# Patient Record
Sex: MALE | Race: OTHER | Employment: OTHER | URBAN - METROPOLITAN AREA
[De-identification: names, ages, dates, MRNs, and addresses within clinical notes are randomized per-mention and may not be internally consistent; named-entity substitution may affect disease eponyms.]

---

## 2020-02-02 ENCOUNTER — HOSPITAL ENCOUNTER (OUTPATIENT)
Age: 45
Discharge: ANOTHER HOSPITAL | End: 2020-02-02
Attending: EMERGENCY MEDICINE
Payer: COMMERCIAL

## 2020-02-02 VITALS
TEMPERATURE: 100 F | BODY MASS INDEX: 27.46 KG/M2 | RESPIRATION RATE: 18 BRPM | SYSTOLIC BLOOD PRESSURE: 150 MMHG | OXYGEN SATURATION: 100 % | HEART RATE: 97 BPM | HEIGHT: 63 IN | DIASTOLIC BLOOD PRESSURE: 105 MMHG | WEIGHT: 155 LBS

## 2020-02-02 DIAGNOSIS — N50.819 TESTICLE PAIN: Primary | ICD-10-CM

## 2020-02-02 PROCEDURE — 99202 OFFICE O/P NEW SF 15 MIN: CPT

## 2020-02-02 NOTE — ED NOTES
Per MD recommendation pt will go to [facility] OP for further evaluation of symptoms.  Pt leaving IC stable no acute distress noted

## 2020-02-02 NOTE — ED PROVIDER NOTES
Patient Seen in: [address]      History   Patient presents with:  Groin Pain    Stated Complaint: PAIN IN GROIN    HPI  Patient reports pain in the left testicle noted this morning. The area feels swollen.   No abdomina swelling reportedly since this morning was referred to the emergency department for further evaluation and ultrasound rule out torsed testicle, mass or other testicular pathology. Dad will drive him directly to [facility] emergency department.         D

## 2020-02-02 NOTE — ED INITIAL ASSESSMENT (HPI)
Per pt having left side testicle pain and swelling since this morning. Denies any urinary symptoms, fevers or chills.